# Patient Record
Sex: FEMALE | Race: BLACK OR AFRICAN AMERICAN | Employment: FULL TIME | URBAN - METROPOLITAN AREA
[De-identification: names, ages, dates, MRNs, and addresses within clinical notes are randomized per-mention and may not be internally consistent; named-entity substitution may affect disease eponyms.]

---

## 2020-07-01 ENCOUNTER — HOSPITAL ENCOUNTER (OUTPATIENT)
Dept: MAMMOGRAPHY | Age: 35
Discharge: HOME | End: 2020-07-01
Payer: MEDICAID

## 2020-07-01 ENCOUNTER — HOSPITAL ENCOUNTER (OUTPATIENT)
Dept: ULTRASOUND IMAGING | Age: 35
Discharge: HOME | End: 2020-07-01
Payer: MEDICAID

## 2020-07-01 DIAGNOSIS — N63.15 BREAST LUMP ON RIGHT SIDE AT 3 O'CLOCK POSITION: ICD-10-CM

## 2020-07-01 PROCEDURE — 76642 ULTRASOUND BREAST LIMITED: CPT

## 2020-07-01 PROCEDURE — 77066 DX MAMMO INCL CAD BI: CPT

## 2021-03-24 ENCOUNTER — TRANSCRIBE ORDER (OUTPATIENT)
Dept: SCHEDULING | Age: 36
End: 2021-03-24

## 2021-03-26 ENCOUNTER — TRANSCRIBE ORDER (OUTPATIENT)
Dept: SCHEDULING | Age: 36
End: 2021-03-26

## 2021-03-26 DIAGNOSIS — R92.8 ABNORMAL MAMMOGRAM: Primary | ICD-10-CM

## 2023-08-20 ENCOUNTER — HOSPITAL ENCOUNTER (EMERGENCY)
Facility: HOSPITAL | Age: 38
Discharge: HOME | End: 2023-08-20
Attending: EMERGENCY MEDICINE

## 2023-08-20 VITALS
BODY MASS INDEX: 30.35 KG/M2 | HEIGHT: 62 IN | OXYGEN SATURATION: 98 % | TEMPERATURE: 98.4 F | DIASTOLIC BLOOD PRESSURE: 47 MMHG | SYSTOLIC BLOOD PRESSURE: 129 MMHG | HEART RATE: 78 BPM | WEIGHT: 164.9 LBS | RESPIRATION RATE: 18 BRPM

## 2023-08-20 DIAGNOSIS — L08.9 FOREIGN BODY IN RIGHT FOOT WITH INFECTION, INITIAL ENCOUNTER: Primary | ICD-10-CM

## 2023-08-20 DIAGNOSIS — S90.851A FOREIGN BODY IN RIGHT FOOT WITH INFECTION, INITIAL ENCOUNTER: Primary | ICD-10-CM

## 2023-08-20 DIAGNOSIS — L02.611 ABSCESS OF SECOND TOE OF RIGHT FOOT: ICD-10-CM

## 2023-08-20 PROCEDURE — 99283 EMERGENCY DEPT VISIT LOW MDM: CPT

## 2023-08-20 PROCEDURE — 10121 INC&RMVL FB SUBQ TISS COMP: CPT

## 2023-08-20 RX ORDER — IBUPROFEN 600 MG/1
600 TABLET ORAL EVERY 6 HOURS PRN
Qty: 20 TABLET | Refills: 1 | Status: SHIPPED | OUTPATIENT
Start: 2023-08-20 | End: 2023-08-30

## 2023-08-20 RX ORDER — AMOXICILLIN AND CLAVULANATE POTASSIUM 875; 125 MG/1; MG/1
1 TABLET, FILM COATED ORAL 2 TIMES DAILY
Qty: 6 TABLET | Refills: 0 | Status: SHIPPED | OUTPATIENT
Start: 2023-08-20 | End: 2023-08-23

## 2023-08-20 ASSESSMENT — PAIN SCALES - GENERAL: PAINLEVEL_OUTOF10: 2

## 2023-08-20 ASSESSMENT — PAIN - FUNCTIONAL ASSESSMENT: PAIN_FUNCTIONAL_ASSESSMENT: 0-10

## 2023-08-20 NOTE — DISCHARGE INSTRUCTIONS
It was a pleasure taking care of you at [facility] Emergency Department today. We know that when you come to [facility], you are entrusting us with your health, comfort, and safety. Our physicians and nurses honor that trust, and we truly appreciate the opportunity to care for you and your loved ones. We also value your feedback. If you receive a survey about your Emergency Department experience today, please fill it out. We care about our patients' feedback, and we listen to what you have to say. Thank you!

## 2023-08-20 NOTE — ED NOTES
I have reviewed the provider's instructions with the patient, answering all questions to her satisfaction.  Pt ambulated to waiting room      Flor Guerrier RN  08/20/23 9721

## 2023-08-21 NOTE — ED PROVIDER NOTES
reports that she for stepped on a needle 1 week ago. She has had persistent sharp needlelike pain. Needle is not visible externally. She went to an urgent care center where x-rays were performed demonstrating a needle in the head of the first metatarsal bone and she was sent to the ED for further management. The patient showed me pictures of the x-rays from the urgent care center. Using ultrasound with a linear probe I was able to identify the foreign body about 1 cm deep under the head of the first metatarsal bone of the right foot. I carefully prepped the skin with ChloraPrep and under local anesthesia made a 1.5 cm incision over the needle, using ultrasound guidance. The needle was quickly identified and extracted, and 2 mL of blood and pus was also expressed, indicating the presence of a small localized abscess around the needle. Patient was started on Augmentin antibiotic and the area was irrigated and she was discharged home. Final Diagnosis:   1. Foreign body in right foot with infection, initial encounter    2. Abscess of second toe of right foot        Additional documentation if relevant for this encounter     Diagnosis and Disposition     Disposition: Decision To Discharge 08/20/2023 03:24:12 PM     Final Diagnosis:   1. Foreign body in right foot with infection, initial encounter    2.  Abscess of second toe of right foot           Medication List        START taking these medications      amoxicillin-clavulanate 875-125 MG per tablet  Commonly known as: AUGMENTIN  Take 1 tablet by mouth 2 times daily for 3 days     ibuprofen 600 MG tablet  Commonly known as: ADVIL;MOTRIN  Take 1 tablet by mouth every 6 hours as needed for Pain               Where to Get Your Medications        These medications were sent to [facility]/pharmacy [address] [place] [address] [phone] [facility] [phone]  [address] [place] [address]      Phone: [phone]